# Patient Record
Sex: MALE | Race: BLACK OR AFRICAN AMERICAN | NOT HISPANIC OR LATINO | Employment: FULL TIME | URBAN - METROPOLITAN AREA
[De-identification: names, ages, dates, MRNs, and addresses within clinical notes are randomized per-mention and may not be internally consistent; named-entity substitution may affect disease eponyms.]

---

## 2020-03-31 PROBLEM — R73.03 PREDIABETES: Status: ACTIVE | Noted: 2020-03-31

## 2020-04-29 ENCOUNTER — TELEPHONE (OUTPATIENT)
Dept: PHARMACY | Facility: CLINIC | Age: 60
End: 2020-04-29

## 2020-04-29 NOTE — TELEPHONE ENCOUNTER
Initial AG Epclusa Consultation attempted (attempt 1). NA, unable to LVM for call back. Will f/u if no call back. $0 copay.

## 2020-04-29 NOTE — TELEPHONE ENCOUNTER
DOCUMENTATION ONLY:  AG epclusa is covered under the patients medicaid plan without authorization with a $0 copay

## 2020-04-29 NOTE — TELEPHONE ENCOUNTER
No answer/VM to inform patient that [facility] received prescription for Epclusa and benefits investigation is required.  [facility] will be back in touch once insurance determination is received. Voicemail full.

## 2020-05-01 NOTE — TELEPHONE ENCOUNTER
Initial AG Epclusa Consultation attempted (attempt 2). NA, unable to LVM for call back. Will f/u if no call back. $0 copay.

## 2020-05-05 NOTE — TELEPHONE ENCOUNTER
3rd attempt to reach patient for Initial AG Epclusa Consultation and to arrange initial shipment. A woman, Clover, answered and said that she will have him call [facility] back at [phone]. Will f/u if no call back. $0 copay. TTN

## 2020-05-06 NOTE — TELEPHONE ENCOUNTER
Initial Epclusa consult completed on . Epclusa will be shipped on  to arrive at patient's home on  via [employer]. $0.00 copay. Patient will start Epclusa on . Address confirmed. Confirmed 2 patient identifiers - name and . Therapy Appropriate.     Epclusa 400/100mg- Take one tablet by mouth daily x 12 weeks  Counseling was reviewed:   1. Patient MUST take Epclusa at the SAME time every day.   2. Patient MUST avoid acid reducers without consulting with myself or provider first. Antacids are to be spaced out at least 4 hours apart from Epclusa.     3. Potential Side effects include: headaches and fatigue.   Headache: Patient may treat with OTC remedies. If Tylenol is used, dose should not exceed 2000mg per day.    4. Medication list reviewed. No DDIs or allergies noted. Patient MUST contact myself or provider prior to starting any new OTC, herbal, or prescription drugs to avoid potential DDIs.    DDI: Medication list reviewed and potential DDIs addressed.  - Protonix is NOT recommended for coadministration with Epclusa. Pt will HOLD Protonix during treatment.  -  Potential DDI Lipitor: Appropriate to continue while on Epclusa; no max Lipitor dosing per package labeling while on Epclusa. Patient must monitor any new onset muscle pain/weakness    Discussed the importance of staying well hydrated while on therapy. Compliance stressed - patient to take missed doses as soon as remembered, but NOT to take 2 doses in one day. Patient will report questions or concerns to myself or practitioner. Patient verbalizes understanding. Patient plans to start Epclusa on .  Consultation included: indication; goals of treatment; administration; storage and handling; side effects; how to handle side effects; the importance of compliance; how to handle missed doses; the importance of laboratory monitoring; the importance of keeping all follow up appointments.  Patient understands to report any medication changes to [facility] and  provider. All questions answered and addressed to patients satisfaction. I will f/u with her in 1 week from start, [facility] to contact patient in 3 weeks for refills.

## 2020-05-13 ENCOUNTER — TELEPHONE (OUTPATIENT)
Dept: PHARMACY | Facility: CLINIC | Age: 60
End: 2020-05-13

## 2020-05-13 NOTE — TELEPHONE ENCOUNTER
PAVEL Epclusa 7-day touch base attempted. No answer. LVM for call back.   - Attempted all phone numbers on file (including work number)

## 2020-05-13 NOTE — TELEPHONE ENCOUNTER
Initial touch base conducted for AG Epclusa - Name/ confirmed.  Confirmed patient started medication as instructed on .  Patient confirms that they are taking AG Epclusa every day at 8 AM.  Patient denies skipping or missing doses.  Patient reports experiencing no side effects since beginning therapy. Patient reports no new medications, otc remedies, or allergies. Patient reminded of lab appointments.  Patient counseled on importance of maintaining adherence and keeping lab appointments which were scheduled.  Advised to call [facility] and provider if any issues arise.  No questions or concerns. Aware [facility] will call for refills when patient has 7 days of medication on hand.    For DDI with Epclusa, reviewed the following:  - Protonix is NOT recommended for coadministration with Epclusa. Pt continues HOLD Protonix during treatment. No heart burn to report at this time. Aware separate Tums by 4 hours from Epclusa, if needed  -  Potential DDI Lipitor: Appropriate to continue while on Epclusa; no max Lipitor dosing per package labeling while on Epclusa. Reports no muscle pain.

## 2020-06-01 ENCOUNTER — TELEPHONE (OUTPATIENT)
Dept: PHARMACY | Facility: CLINIC | Age: 60
End: 2020-06-01

## 2020-06-02 NOTE — TELEPHONE ENCOUNTER
AG Epclusa refill (2 of 3) confirmed and reassessment complete. We will ship AG Eplcusa refill on  via [employer] to arrive on 6/3. $0 copay- . Confirmed 2 patient identifiers - name and . Therapy appropriate.     Patient has 2 doses of AG Epclusa remaining and takes it around noon daily.  Pt reports they are not having any side effects so far. No missed doses, no new medications, no new allergies or health conditions reported at this time. Allergies reviewed and medication reconciliation complete (reviewed and documented in [facility] and [facility]).  Disease education reviewed (including transmission and prevention). Patient counseled on importance of maintaining adherence and keeping lab appointments which were scheduled. All questions answered and addressed to patients satisfaction. Advised to call [facility] and provider if any issues arise.  Pt verbalized understanding.    YAYO Staley.Ph., AAHIVP  Clinical Pharmacist, HIV/HCV  [facility]  Phone: [phone]

## 2020-06-09 PROBLEM — B18.2 CHRONIC HEPATITIS C WITHOUT HEPATIC COMA: Status: ACTIVE | Noted: 2020-06-09

## 2020-06-24 ENCOUNTER — TELEPHONE (OUTPATIENT)
Dept: PHARMACY | Facility: CLINIC | Age: 60
End: 2020-06-24

## 2020-06-24 NOTE — TELEPHONE ENCOUNTER
Patient called for refill readiness and follow up on AG Epclusa. No answer - lvm for call back.     YAYO Staley.Ph., AAHIVP  Clinical Pharmacist, HIV/HCV  [facility]  Phone: [phone]

## 2020-06-26 NOTE — TELEPHONE ENCOUNTER
Epclusa refill (3 of 3) and reassessment attempted (Attempt 2). Called preferred number, work number and mobile number. No answer. Only able to LVM for call back on mobile number [phone]. Will f/u with patient if no return call.   - Copay $0.00

## 2020-06-30 NOTE — TELEPHONE ENCOUNTER
AG Epclusa refill (3 of 3) confirmed and reassessment complete. We will ship AG Eplcusa refill on  via [employer] to arrive on . $0 copay-004 . Confirmed 2 patient identifiers - name and . Therapy appropriate.     Patient has 3 doses of AG Epclusa remaining and takes it around 8 AM daily.  Pt reports they are not having any side effects so far. No missed doses, no new medications, no new allergies or health conditions reported at this time. Allergies reviewed and medication reconciliation complete (reviewed and documented in [facility] and [facility]).  Disease education reviewed (including transmission and prevention). Patient counseled on importance of maintaining adherence and keeping lab appointments which were scheduled. All questions answered and addressed to patients satisfaction. Advised to call [facility] and provider if any issues arise.  Pt verbalized understanding.    For DDI with Epclusa, reviewed the following:  - Protonix is NOT recommended for coadministration with Epclusa. Pt continues HOLD Protonix during treatment. No heart burn to report at this time. Aware separate Tums by 4 hours from Epclusa, if needed  -  Potential DDI Lipitor: Appropriate to continue while on Epclusa; no max Lipitor dosing per package labeling while on Epclusa. Reports no muscle pain.

## 2020-06-30 NOTE — TELEPHONE ENCOUNTER
Incoming call from patient's brother, Andrea, for Epclusa refill (3 of 3). [facility] set up refill with Andrea; however, Andrea was unable to complete Epclusa reassessment. [facility] asked Andrea to have patient call [facility] back in order to complete reassessment; Andrea verbally agreed to have patient call back. [facility] also attempted to call patient at preferred # with NA. We will ship Epclusa refill on  via [employer]ex to arrive on . Patient should have 1 dose remaining for tomorrow based on last reported on hand count of 2 on . $0.00 copay- 004. Confirmed 2 patient identifiers - name and . Therapy appropriate.     - Will complete full reassessment at call back.

## 2021-04-09 PROBLEM — N52.9 ERECTILE DYSFUNCTION: Status: ACTIVE | Noted: 2021-04-09

## 2021-04-09 PROBLEM — M54.2 NECK PAIN: Status: ACTIVE | Noted: 2021-04-09

## 2021-10-15 PROBLEM — L94.2 CALCINOSIS CUTIS: Status: ACTIVE | Noted: 2021-10-15

## 2022-03-03 PROBLEM — L85.9 HYPERKERATOSIS: Status: ACTIVE | Noted: 2022-03-03

## 2022-03-03 PROBLEM — I73.9 PAD (PERIPHERAL ARTERY DISEASE): Status: ACTIVE | Noted: 2022-03-03

## 2022-04-23 PROBLEM — Z12.5 PROSTATE CANCER SCREENING: Status: ACTIVE | Noted: 2022-04-23

## 2022-04-23 PROBLEM — A60.00 GENITAL HERPES: Status: ACTIVE | Noted: 2022-04-23

## 2022-08-26 PROBLEM — Z12.5 PROSTATE CANCER SCREENING: Status: RESOLVED | Noted: 2022-04-23 | Resolved: 2022-08-26

## 2023-05-10 PROBLEM — R22.0 MOUTH SWELLING: Status: ACTIVE | Noted: 2023-05-10

## 2023-05-14 PROBLEM — Z00.00 HEALTHCARE MAINTENANCE: Status: ACTIVE | Noted: 2023-05-14

## 2023-05-14 PROBLEM — R31.9 HEMATURIA: Status: ACTIVE | Noted: 2023-05-14

## 2023-08-09 PROBLEM — K57.30 DIVERTICULOSIS OF LARGE INTESTINE WITHOUT DIVERTICULITIS: Status: ACTIVE | Noted: 2023-08-09

## 2023-08-09 PROBLEM — D12.2 BENIGN NEOPLASM OF ASCENDING COLON: Status: ACTIVE | Noted: 2023-08-09

## 2023-08-14 PROBLEM — Z00.00 HEALTHCARE MAINTENANCE: Status: RESOLVED | Noted: 2023-05-14 | Resolved: 2023-08-14
